# Patient Record
Sex: MALE | Race: WHITE | NOT HISPANIC OR LATINO | Employment: FULL TIME | URBAN - METROPOLITAN AREA
[De-identification: names, ages, dates, MRNs, and addresses within clinical notes are randomized per-mention and may not be internally consistent; named-entity substitution may affect disease eponyms.]

---

## 2023-01-19 ENCOUNTER — OFFICE VISIT (OUTPATIENT)
Dept: FAMILY MEDICINE | Facility: CLINIC | Age: 28
End: 2023-01-19
Payer: COMMERCIAL

## 2023-01-19 VITALS
RESPIRATION RATE: 20 BRPM | HEIGHT: 67 IN | OXYGEN SATURATION: 100 % | DIASTOLIC BLOOD PRESSURE: 60 MMHG | BODY MASS INDEX: 19.62 KG/M2 | HEART RATE: 76 BPM | WEIGHT: 125 LBS | SYSTOLIC BLOOD PRESSURE: 100 MMHG

## 2023-01-19 DIAGNOSIS — F90.0 ADHD, PREDOMINANTLY INATTENTIVE TYPE: ICD-10-CM

## 2023-01-19 DIAGNOSIS — Z23 ENCOUNTER FOR IMMUNIZATION: ICD-10-CM

## 2023-01-19 DIAGNOSIS — Z00.00 ROUTINE PHYSICAL EXAMINATION: Primary | ICD-10-CM

## 2023-01-19 DIAGNOSIS — B35.4 TINEA CORPORIS: ICD-10-CM

## 2023-01-19 DIAGNOSIS — R21 RASH: ICD-10-CM

## 2023-01-19 DIAGNOSIS — F33.9 EPISODE OF RECURRENT MAJOR DEPRESSIVE DISORDER, UNSPECIFIED DEPRESSION EPISODE SEVERITY (H): ICD-10-CM

## 2023-01-19 DIAGNOSIS — F41.9 ANXIETY: ICD-10-CM

## 2023-01-19 PROCEDURE — 90651 9VHPV VACCINE 2/3 DOSE IM: CPT | Performed by: FAMILY MEDICINE

## 2023-01-19 PROCEDURE — 99385 PREV VISIT NEW AGE 18-39: CPT | Mod: 25 | Performed by: FAMILY MEDICINE

## 2023-01-19 PROCEDURE — 90715 TDAP VACCINE 7 YRS/> IM: CPT | Performed by: FAMILY MEDICINE

## 2023-01-19 PROCEDURE — 90472 IMMUNIZATION ADMIN EACH ADD: CPT | Performed by: FAMILY MEDICINE

## 2023-01-19 PROCEDURE — 99214 OFFICE O/P EST MOD 30 MIN: CPT | Mod: 25 | Performed by: FAMILY MEDICINE

## 2023-01-19 PROCEDURE — 90471 IMMUNIZATION ADMIN: CPT | Performed by: FAMILY MEDICINE

## 2023-01-19 RX ORDER — BUPROPION HYDROCHLORIDE 150 MG/1
TABLET ORAL
Qty: 60 TABLET | Refills: 3 | Status: SHIPPED | OUTPATIENT
Start: 2023-01-19 | End: 2023-01-27

## 2023-01-19 RX ORDER — KETOCONAZOLE 20 MG/ML
SHAMPOO TOPICAL
Qty: 120 ML | Refills: 0 | Status: SHIPPED | OUTPATIENT
Start: 2023-01-19

## 2023-01-19 ASSESSMENT — ENCOUNTER SYMPTOMS
HEARTBURN: 0
DIZZINESS: 0
EYE PAIN: 0
CHILLS: 0
NAUSEA: 0
PALPITATIONS: 0
FEVER: 0
SHORTNESS OF BREATH: 0
COUGH: 0
DYSURIA: 0
HEADACHES: 0
JOINT SWELLING: 0
ABDOMINAL PAIN: 0
DIARRHEA: 0
SORE THROAT: 0
NERVOUS/ANXIOUS: 1
HEMATOCHEZIA: 0
MYALGIAS: 0
CONSTIPATION: 0
WEAKNESS: 0
HEMATURIA: 0
ARTHRALGIAS: 0
FREQUENCY: 0
PARESTHESIAS: 0

## 2023-01-19 ASSESSMENT — PATIENT HEALTH QUESTIONNAIRE - PHQ9
SUM OF ALL RESPONSES TO PHQ QUESTIONS 1-9: 12
10. IF YOU CHECKED OFF ANY PROBLEMS, HOW DIFFICULT HAVE THESE PROBLEMS MADE IT FOR YOU TO DO YOUR WORK, TAKE CARE OF THINGS AT HOME, OR GET ALONG WITH OTHER PEOPLE: SOMEWHAT DIFFICULT
SUM OF ALL RESPONSES TO PHQ QUESTIONS 1-9: 12

## 2023-01-19 ASSESSMENT — PAIN SCALES - GENERAL: PAINLEVEL: NO PAIN (0)

## 2023-01-19 NOTE — PROGRESS NOTES
Assessment/Plan:     Routine physical examination  Routine healthcare maintenance.  Preventative cares reviewed.  Annual physical exams to continue.    Encounter for immunization  Will provide HPV #3 as final shot and 3 shot series.  Also Adacel booster provided today.  Patient declines seasonal influenza, COVID-19 bivalent booster etc.  - Human Papilloma Virus Vaccine (Gardasil 9) 3 Dose IM  - TDAP VACCINE (Adacel, Boostrix)    Tinea corporis  Evidence for tinea corporis with possible tinea versicolor.  Ketoconazole shampoo as directed topically daily x3 days.  - ketoconazole (NIZORAL) 2 % external shampoo  Dispense: 120 mL; Refill: 0    Rash  As above.  - ketoconazole (NIZORAL) 2 % external shampoo  Dispense: 120 mL; Refill: 0    ADHD, predominantly inattentive type  ADHD predominantly inattentive type.  Anticipate benefits of bupropion  mg daily x7 days then 300 mg daily following with anticipated reassessment in office in 6 weeks.  - buPROPion (WELLBUTRIN XL) 150 MG 24 hr tablet  Dispense: 60 tablet; Refill: 3    Episode of recurrent major depressive disorder, unspecified depression episode severity (H)  Underlying history of depression reviewed, recurrent.  Bupropion  mg titrating to 300 mg daily dose as above.  - buPROPion (WELLBUTRIN XL) 150 MG 24 hr tablet  Dispense: 60 tablet; Refill: 3    Anxiety  Will monitor currently for anticipated benefits with bupropion use.  Otherwise addition anxiolytic medication options to be considered.          Subjective:     Geoffrey Menjivar is a 27 year old male who presents for an annual exam.  In general doing well.  Wants to discuss however recurrent concerns for depression and anxiety.  History of ADHD predominantly inattentive type historically.  Had utilize Ritalin as well as Concerta in the past.  Has not been on medication recently.  Sertraline utilize for anxiety depression management in adolescence.  Patient does have rash to left axillary region as  "well as left posterior upper arm question of tinea versicolor per patient.  Past medical social and family history reviewed and updated as noted below.  Comprehensive review of systems as above otherwise all negative.      Single  No children  Tobacco:  none  EtOH:  none  Mom - healthy  Dad - healthy  2 younger maternal half-bro -   1 younger maternal half-sis -   Surgeries:  tonsillectomy and wisdom teeth x 4  Hospitalizations:  none  Work:  [employer] (\[employer] in [facility])  Hobbies:  rock climbing (h/o [employer]); tech stuff with audio gear and computers      Healthy Habits:     Getting at least 3 servings of Calcium per day:  NO    Bi-annual eye exam:  NO    Dental care twice a year:  NO    Sleep apnea or symptoms of sleep apnea:  None    Diet:  Regular (no restrictions)    Frequency of exercise:  1 day/week    Duration of exercise:  15-30 minutes    Taking medications regularly:  Yes    Medication side effects:  None    PHQ-2 Total Score: 4    Additional concerns today:  Yes       Immunization History   Administered Date(s) Administered     COVID-19 Vaccine 12+ (Pfizer) 04/23/2021, 05/14/2021, 12/21/2021     COVID-19 Vaccine Bivalent Booster 18+ (Moderna) 12/12/2022     DTAP (<7y) 1996, 1996, 1996, 06/17/1997, 04/20/2001     HEPA 05/31/2008, 08/25/2009     HPV 05/07/2012, 07/22/2014     HPV Quadrivalent 05/07/2012     Hep B, Peds or Adolescent 1996     HepA-ped 2 Dose 05/31/2008, 08/25/2009     HepB 1996, 1996, 06/17/1997     HepB, Unspecified 1996, 06/17/1997     Hib (PRP-T) 1996, 1996, 1996, 06/17/1997     Hib, Unspecified 1996, 1996, 1996, 06/17/1997     Influenza (H1N1) 11/07/2009     Influenza (IIV3) PF 11/02/2005, 11/15/2006, 11/05/2007, 09/28/2009, 09/20/2010, 10/24/2011, 09/17/2012     MMR 06/17/1997, 04/20/2001     Mantoux Tuberculin Skin Test 06/04/2012     Meningococcal (Menomune ) 08/23/2010     " Meningococcal ACWY (Menactra ) 08/23/2010, 07/22/2014     Polio, Unspecified  1996, 1996, 1996, 04/20/2001     Poliovirus, inactivated (IPV) 1996, 1996, 1996, 04/20/2001     Td (Adult), Adsorbed 05/31/2008     Tdap (Adacel,Boostrix) 05/31/2008     Varicella 06/17/1997, 08/25/2009     Immunization status: HPV #3 as well as Adacel booster to be provided today otherwise patient declines seasonal influenza vaccination, Covid-19 bivalent booster etc.    Current Outpatient Medications   Medication Sig Dispense Refill     buPROPion (WELLBUTRIN XL) 150 MG 24 hr tablet Take one tablet (150 mg) by mouth daily x 7 days, then increase to two tablets (300 mg) by mouth daily 60 tablet 3     ketoconazole (NIZORAL) 2 % external shampoo Apply topically to wet skin, lather and leave on 5 minutes.  Then rinse.  Repeat daily x 3 days total. 120 mL 0     fluocinonide (LIDEX) 0.05 % ointment Apply sparingly to affected area on left arm twice daily.  Do not apply to face. (Patient not taking: Reported on 1/19/2023) 60 g 2     No past medical history on file.  Past Surgical History:   Procedure Laterality Date     TONSILLECTOMY      at 8 years old     Patient has no known allergies.  No family history on file.  Social History     Socioeconomic History     Marital status: Single     Spouse name: Not on file     Number of children: Not on file     Years of education: Not on file     Highest education level: Not on file   Occupational History     Not on file   Tobacco Use     Smoking status: Never     Smokeless tobacco: Not on file   Substance and Sexual Activity     Alcohol use: No     Drug use: No     Sexual activity: Never   Other Topics Concern     Parent/sibling w/ CABG, MI or angioplasty before 65F 55M? Not Asked   Social History Narrative     Not on file     Social Determinants of Health     Financial Resource Strain: Not on file   Food Insecurity: Not on file   Transportation Needs: Not on file  "  Physical Activity: Not on file   Stress: Not on file   Social Connections: Not on file   Intimate Partner Violence: Not on file   Housing Stability: Not on file       Review of Systems  Comprehensive ROS: as above, otherwise all negative.           Objective:     /60   Pulse 76   Resp 20   Ht 1.702 m (5' 7\")   Wt 56.7 kg (125 lb)   SpO2 100%   BMI 19.58 kg/m    Body mass index is 19.58 kg/m .    Physical    General Appearance:    Alert, cooperative, no distress, appears stated age.     Head:    Normocephalic, without obvious abnormality, atraumatic   Eyes:    PERRL, conjunctiva/corneas clear, EOM's intact, fundi     benign, both eyes        Ears:    Normal TM's and external ear canals, both ears   Nose:   Nares normal, septum midline, mucosa normal, no drainage    or sinus tenderness   Throat:   Lips, mucosa, and tongue normal; teeth and gums normal   Neck:   Supple, symmetrical, trachea midline, no adenopathy;        thyroid:  No enlargement/tenderness/nodules; no carotid    bruit or JVD   Back:     Symmetric, no curvature, ROM normal, no CVA tenderness   Lungs:     Clear to auscultation bilaterally, respirations unlabored   Chest wall:    No tenderness or deformity   Heart:    Regular rate and rhythm, S1 and S2 normal, no murmur, rub   or gallop   Abdomen:     Soft, non-tender, bowel sounds active all four quadrants,     no masses, no organomegaly.     Genitalia:    Deferred per patient request   Rectal:    deferred   Extremities:   Extremities normal, atraumatic, no cyanosis or edema   Pulses:   2+ and symmetric all extremities   Skin:   Skin color, texture, turgor normal, no rashes or lesions   Lymph nodes:   Cervical, supraclavicular, and axillary nodes normal   Neurologic:   CNII-XII intact. Normal strength, sensation and reflexes       throughout                This note has been dictated using voice recognition software and as a result may contain minor grammatical errors and unintended word " substitutions.         Answers for HPI/ROS submitted by the patient on 1/19/2023  If you checked off any problems, how difficult have these problems made it for you to do your work, take care of things at home, or get along with other people?: Somewhat difficult  PHQ9 TOTAL SCORE: 12  Frequency of exercise:: 1 day/week  Getting at least 3 servings of Calcium per day:: NO  Diet:: Regular (no restrictions)  Taking medications regularly:: Yes  Medication side effects:: None  Bi-annual eye exam:: NO  Dental care twice a year:: NO  Sleep apnea or symptoms of sleep apnea:: None  abdominal pain: No  Blood in stool: No  Blood in urine: No  chest pain: No  chills: No  congestion: No  constipation: No  cough: No  diarrhea: No  dizziness: No  ear pain: No  eye pain: No  nervous/anxious: Yes  fever: No  frequency: No  genital sores: No  headaches: No  hearing loss: No  heartburn: No  arthralgias: No  joint swelling: No  peripheral edema: No  mood changes: Yes  myalgias: No  nausea: No  dysuria: No  palpitations: No  Skin sensation changes: No  sore throat: No  urgency: No  rash: No  shortness of breath: No  visual disturbance: No  weakness: No  impotence: No  penile discharge: No  Additional concerns today:: Yes  Duration of exercise:: 15-30 minutes

## 2023-02-13 DIAGNOSIS — F33.9 EPISODE OF RECURRENT MAJOR DEPRESSIVE DISORDER, UNSPECIFIED DEPRESSION EPISODE SEVERITY (H): ICD-10-CM

## 2023-02-13 DIAGNOSIS — F90.0 ADHD, PREDOMINANTLY INATTENTIVE TYPE: ICD-10-CM

## 2023-02-14 RX ORDER — BUPROPION HYDROCHLORIDE 150 MG/1
TABLET ORAL
Qty: 180 TABLET | Refills: 1 | OUTPATIENT
Start: 2023-02-14

## 2023-03-23 ENCOUNTER — OFFICE VISIT (OUTPATIENT)
Dept: FAMILY MEDICINE | Facility: CLINIC | Age: 28
End: 2023-03-23
Payer: COMMERCIAL

## 2023-03-23 VITALS
RESPIRATION RATE: 16 BRPM | BODY MASS INDEX: 19.66 KG/M2 | SYSTOLIC BLOOD PRESSURE: 103 MMHG | HEART RATE: 88 BPM | WEIGHT: 125.5 LBS | DIASTOLIC BLOOD PRESSURE: 67 MMHG | TEMPERATURE: 98.8 F | OXYGEN SATURATION: 98 %

## 2023-03-23 DIAGNOSIS — F33.9 EPISODE OF RECURRENT MAJOR DEPRESSIVE DISORDER, UNSPECIFIED DEPRESSION EPISODE SEVERITY (H): ICD-10-CM

## 2023-03-23 DIAGNOSIS — F41.9 ANXIETY: ICD-10-CM

## 2023-03-23 DIAGNOSIS — B35.4 TINEA CORPORIS: ICD-10-CM

## 2023-03-23 DIAGNOSIS — F90.0 ADHD, PREDOMINANTLY INATTENTIVE TYPE: Primary | ICD-10-CM

## 2023-03-23 DIAGNOSIS — F90.0 ADHD, PREDOMINANTLY INATTENTIVE TYPE: ICD-10-CM

## 2023-03-23 PROCEDURE — 99214 OFFICE O/P EST MOD 30 MIN: CPT | Performed by: FAMILY MEDICINE

## 2023-03-23 RX ORDER — BUPROPION HYDROCHLORIDE 150 MG/1
TABLET ORAL
Qty: 270 TABLET | Refills: 1 | Status: SHIPPED | OUTPATIENT
Start: 2023-03-23 | End: 2023-03-24

## 2023-03-23 ASSESSMENT — ANXIETY QUESTIONNAIRES
3. WORRYING TOO MUCH ABOUT DIFFERENT THINGS: NOT AT ALL
IF YOU CHECKED OFF ANY PROBLEMS ON THIS QUESTIONNAIRE, HOW DIFFICULT HAVE THESE PROBLEMS MADE IT FOR YOU TO DO YOUR WORK, TAKE CARE OF THINGS AT HOME, OR GET ALONG WITH OTHER PEOPLE: NOT DIFFICULT AT ALL
7. FEELING AFRAID AS IF SOMETHING AWFUL MIGHT HAPPEN: NOT AT ALL
7. FEELING AFRAID AS IF SOMETHING AWFUL MIGHT HAPPEN: NOT AT ALL
4. TROUBLE RELAXING: SEVERAL DAYS
8. IF YOU CHECKED OFF ANY PROBLEMS, HOW DIFFICULT HAVE THESE MADE IT FOR YOU TO DO YOUR WORK, TAKE CARE OF THINGS AT HOME, OR GET ALONG WITH OTHER PEOPLE?: NOT DIFFICULT AT ALL
2. NOT BEING ABLE TO STOP OR CONTROL WORRYING: NOT AT ALL
5. BEING SO RESTLESS THAT IT IS HARD TO SIT STILL: NOT AT ALL
GAD7 TOTAL SCORE: 1
GAD7 TOTAL SCORE: 1
6. BECOMING EASILY ANNOYED OR IRRITABLE: NOT AT ALL
1. FEELING NERVOUS, ANXIOUS, OR ON EDGE: NOT AT ALL
GAD7 TOTAL SCORE: 1

## 2023-03-23 ASSESSMENT — PAIN SCALES - GENERAL: PAINLEVEL: NO PAIN (0)

## 2023-03-23 ASSESSMENT — PATIENT HEALTH QUESTIONNAIRE - PHQ9
10. IF YOU CHECKED OFF ANY PROBLEMS, HOW DIFFICULT HAVE THESE PROBLEMS MADE IT FOR YOU TO DO YOUR WORK, TAKE CARE OF THINGS AT HOME, OR GET ALONG WITH OTHER PEOPLE: NOT DIFFICULT AT ALL
SUM OF ALL RESPONSES TO PHQ QUESTIONS 1-9: 4
SUM OF ALL RESPONSES TO PHQ QUESTIONS 1-9: 4

## 2023-03-23 NOTE — PROGRESS NOTES
Assessment/Plan:    ADHD, predominantly inattentive type  ADHD predominantly inattentive type improved with bupropion currently at 300 mg daily dose however would like to try higher dose.  Will increase to 450 mg to see if further improvement in focus and concentration.  Feels that executive function has improved and does have improved appetite as well.  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  - buPROPion (WELLBUTRIN XL) 150 MG 24 hr tablet  Dispense: 270 tablet; Refill: 1    Episode of recurrent major depressive disorder, unspecified depression episode severity (H)  Underlying depression noted.  PHQ-9 questionnaire 4 out of 27.  Patient would like to try higher dose of bupropion XL currently 300 mg and did increase to 450 mg daily.  Reassess in August 2023.  - buPROPion (WELLBUTRIN XL) 150 MG 24 hr tablet  Dispense: 270 tablet; Refill: 1    Anxiety  SANA-7 questionnaire 1 out of 21 currently.    Tinea corporis  Nizoral 2% shampoo topically daily x3 days to area of tinea corporis question component of tinea versicolor.          Subjective:    Geoffrey Menjivar is seen today for follow-up assessment.  ADHD predominantly inattentive type.  Has utilized Ritalin and Concerta in the past.  Did discuss at physical January 19, 2023 regarding treatment options for ADHD.  Underlying depression and anxiety were reviewed and did initiate bupropion  mg daily x7 days then 300 mg daily following.  Has tolerated well without side effects.  Has had increased appetite.  Improvement in executive functioning as well described.  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  Has had rash on skin question tinea corporis etiology and did try Nizoral 2% shampoo previously.  Comprehensive review of systems as above otherwise all negative    Single   No children   Tobacco:  none   EtOH:  none   Mom - healthy   Dad - healthy   2 younger maternal half-bro -   1 younger maternal  "half-sis -   Surgeries:  tonsillectomy and wisdom teeth x 4   Hospitalizations:  none   Work:  [employer] (\[employer] in [employer])   Hobbies:  rock climbing (h/o [employer]); tech stuff with audio gear and computers    Past Surgical History:   Procedure Laterality Date     TONSILLECTOMY      at 8 years old        No family history on file.     No past medical history on file.     Social History     Tobacco Use     Smoking status: Never   Substance Use Topics     Alcohol use: No     Drug use: No        Current Outpatient Medications   Medication Sig Dispense Refill     buPROPion (WELLBUTRIN XL) 150 MG 24 hr tablet TAKE THREE TABLETS (450 MG) BY MOUTH DAILY 270 tablet 1     ketoconazole (NIZORAL) 2 % external shampoo Apply topically to wet skin, lather and leave on 5 minutes.  Then rinse.  Repeat daily x 3 days total. (Patient not taking: Reported on 3/23/2023) 120 mL 0          Objective:    Vitals:    03/23/23 1444   BP: 103/67   BP Location: Left arm   Patient Position: Sitting   Cuff Size: Adult Regular   Pulse: 88   Resp: 16   Temp: 98.8  F (37.1  C)   TempSrc: Temporal   SpO2: 98%   Weight: 56.9 kg (125 lb 8 oz)      Body mass index is 19.66 kg/m .    Alert.  No apparent distress.  Cooperative and forthcoming.  No psychomotor agitation.  Appropriate eye contact.  Well-groomed.  No psychosis.      This note has been dictated using voice recognition software and as a result may contain minor grammatical errors and unintended word substitutions.   Answers for HPI/ROS submitted by the patient on 3/23/2023  If you checked off any problems, how difficult have these problems made it for you to do your work, take care of things at home, or get along with other people?: Not difficult at all  PHQ9 TOTAL SCORE: 4  SANA 7 TOTAL SCORE: 1  Depression/Anxiety: Depression & Anxiety  Status since last visit:: better  Anxiety since last: : better  Other associated symptoms of depression:: Yes  Other associated " symotome: : No  Significant life event: : No  Anxious:: No  Current substance use:: No  What is the reason for your visit today? : Medication follow up  How many servings of fruits and vegetables do you eat daily?: 0-1  On average, how many sweetened beverages do you drink each day (Examples: soda, juice, sweet tea, etc.  Do NOT count diet or artificially sweetened beverages)?: 1  How many minutes a day do you exercise enough to make your heart beat faster?: 30 to 60  How many days a week do you exercise enough to make your heart beat faster?: 3 or less  How many days per week do you miss taking your medication?: 0

## 2023-03-24 DIAGNOSIS — F90.0 ADHD, PREDOMINANTLY INATTENTIVE TYPE: ICD-10-CM

## 2023-03-24 DIAGNOSIS — F33.9 EPISODE OF RECURRENT MAJOR DEPRESSIVE DISORDER, UNSPECIFIED DEPRESSION EPISODE SEVERITY (H): ICD-10-CM

## 2023-03-24 RX ORDER — BUPROPION HYDROCHLORIDE 150 MG/1
TABLET ORAL
Qty: 90 TABLET | Refills: 1 | Status: SHIPPED | OUTPATIENT
Start: 2023-03-24

## 2023-03-24 RX ORDER — BUPROPION HYDROCHLORIDE 300 MG/1
300 TABLET ORAL EVERY MORNING
Qty: 90 TABLET | Refills: 1 | Status: SHIPPED | OUTPATIENT
Start: 2023-03-24 | End: 2023-09-22

## 2023-03-24 NOTE — TELEPHONE ENCOUNTER
"Routing refill request to provider for review/approval because:  Patient requesting change to prescription from 3 tablets daily to 1 tablet of 150 mg and 1 tablet of 300 mg daily due to insurance. Prescriptions written per pharmacy request and routed to provider for review.    Last Written Prescription Date:  3/23/2023  Last Fill Quantity: 270 of 150 mg tablets,  # refills: 1   Last office visit provider:  3/23/2023     Requested Prescriptions   Pending Prescriptions Disp Refills     buPROPion (WELLBUTRIN XL) 150 MG 24 hr tablet [Pharmacy Med Name: BUPROPION HCL  MG TABLET] 270 tablet 1     Sig: TAKE THREE TABLETS (450 MG) BY MOUTH DAILY       SSRIs Protocol Passed - 3/23/2023  3:18 PM        Passed - PHQ-9 score less than 5 in past 6 months     Please review last PHQ-9 score.           Passed - Medication is Bupropion     If the medication is Bupropion (Wellbutrin), and the patient is taking for smoking cessation; OK to refill.          Passed - Medication is active on med list        Passed - Patient is age 18 or older        Passed - Recent (6 mo) or future (30 days) visit within the authorizing provider's specialty     Patient had office visit in the last 6 months or has a visit in the next 30 days with authorizing provider or within the authorizing provider's specialty.  See \"Patient Info\" tab in inbasket, or \"Choose Columns\" in Meds & Orders section of the refill encounter.                 Queenie Vazquez RN 03/24/23 2:17 PM      "

## 2023-03-26 DIAGNOSIS — F33.9 EPISODE OF RECURRENT MAJOR DEPRESSIVE DISORDER, UNSPECIFIED DEPRESSION EPISODE SEVERITY (H): ICD-10-CM

## 2023-03-26 DIAGNOSIS — F90.0 ADHD, PREDOMINANTLY INATTENTIVE TYPE: ICD-10-CM

## 2023-03-26 RX ORDER — BUPROPION HYDROCHLORIDE 450 MG/1
450 TABLET, FILM COATED, EXTENDED RELEASE ORAL DAILY
Qty: 30 TABLET | Refills: 3 | OUTPATIENT
Start: 2023-03-26

## 2023-03-26 RX ORDER — BUPROPION HYDROCHLORIDE 150 MG/1
TABLET ORAL
Qty: 90 TABLET | Refills: 1 | OUTPATIENT
Start: 2023-03-26

## 2023-03-26 RX ORDER — BUPROPION HYDROCHLORIDE 450 MG/1
450 TABLET, FILM COATED, EXTENDED RELEASE ORAL DAILY
Qty: 30 TABLET | Refills: 3 | Status: SHIPPED | OUTPATIENT
Start: 2023-03-26

## 2023-04-09 ENCOUNTER — HEALTH MAINTENANCE LETTER (OUTPATIENT)
Age: 28
End: 2023-04-09

## 2024-01-18 ENCOUNTER — PATIENT OUTREACH (OUTPATIENT)
Dept: CARE COORDINATION | Facility: CLINIC | Age: 29
End: 2024-01-18
Payer: COMMERCIAL

## 2024-02-01 ENCOUNTER — PATIENT OUTREACH (OUTPATIENT)
Dept: CARE COORDINATION | Facility: CLINIC | Age: 29
End: 2024-02-01
Payer: COMMERCIAL

## 2024-04-07 ENCOUNTER — HEALTH MAINTENANCE LETTER (OUTPATIENT)
Age: 29
End: 2024-04-07

## 2025-02-04 ASSESSMENT — ANXIETY QUESTIONNAIRES
7. FEELING AFRAID AS IF SOMETHING AWFUL MIGHT HAPPEN: NOT AT ALL
5. BEING SO RESTLESS THAT IT IS HARD TO SIT STILL: NOT AT ALL
8. IF YOU CHECKED OFF ANY PROBLEMS, HOW DIFFICULT HAVE THESE MADE IT FOR YOU TO DO YOUR WORK, TAKE CARE OF THINGS AT HOME, OR GET ALONG WITH OTHER PEOPLE?: NOT DIFFICULT AT ALL
1. FEELING NERVOUS, ANXIOUS, OR ON EDGE: SEVERAL DAYS
4. TROUBLE RELAXING: NOT AT ALL
IF YOU CHECKED OFF ANY PROBLEMS ON THIS QUESTIONNAIRE, HOW DIFFICULT HAVE THESE PROBLEMS MADE IT FOR YOU TO DO YOUR WORK, TAKE CARE OF THINGS AT HOME, OR GET ALONG WITH OTHER PEOPLE: NOT DIFFICULT AT ALL
7. FEELING AFRAID AS IF SOMETHING AWFUL MIGHT HAPPEN: NOT AT ALL
GAD7 TOTAL SCORE: 2
3. WORRYING TOO MUCH ABOUT DIFFERENT THINGS: NOT AT ALL
6. BECOMING EASILY ANNOYED OR IRRITABLE: SEVERAL DAYS
2. NOT BEING ABLE TO STOP OR CONTROL WORRYING: NOT AT ALL
GAD7 TOTAL SCORE: 2
GAD7 TOTAL SCORE: 2

## 2025-02-06 ENCOUNTER — OFFICE VISIT (OUTPATIENT)
Dept: FAMILY MEDICINE | Facility: CLINIC | Age: 30
End: 2025-02-06
Payer: COMMERCIAL

## 2025-02-06 VITALS
BODY MASS INDEX: 19.93 KG/M2 | HEIGHT: 67 IN | TEMPERATURE: 98.1 F | SYSTOLIC BLOOD PRESSURE: 100 MMHG | RESPIRATION RATE: 18 BRPM | HEART RATE: 85 BPM | OXYGEN SATURATION: 98 % | WEIGHT: 127 LBS | DIASTOLIC BLOOD PRESSURE: 60 MMHG

## 2025-02-06 DIAGNOSIS — F41.9 ANXIETY: ICD-10-CM

## 2025-02-06 DIAGNOSIS — F33.9 EPISODE OF RECURRENT MAJOR DEPRESSIVE DISORDER, UNSPECIFIED DEPRESSION EPISODE SEVERITY: Primary | ICD-10-CM

## 2025-02-06 DIAGNOSIS — F90.0 ADHD, PREDOMINANTLY INATTENTIVE TYPE: ICD-10-CM

## 2025-02-06 RX ORDER — BUPROPION HYDROCHLORIDE 150 MG/1
TABLET ORAL
Qty: 180 TABLET | Refills: 1 | Status: SHIPPED | OUTPATIENT
Start: 2025-02-06

## 2025-02-06 ASSESSMENT — PATIENT HEALTH QUESTIONNAIRE - PHQ9
SUM OF ALL RESPONSES TO PHQ QUESTIONS 1-9: 11
SUM OF ALL RESPONSES TO PHQ QUESTIONS 1-9: 11
10. IF YOU CHECKED OFF ANY PROBLEMS, HOW DIFFICULT HAVE THESE PROBLEMS MADE IT FOR YOU TO DO YOUR WORK, TAKE CARE OF THINGS AT HOME, OR GET ALONG WITH OTHER PEOPLE: SOMEWHAT DIFFICULT

## 2025-02-06 ASSESSMENT — PAIN SCALES - GENERAL: PAINLEVEL_OUTOF10: NO PAIN (0)

## 2025-02-06 NOTE — PROGRESS NOTES
"Assessment/Plan:    Episode of recurrent major depressive disorder, unspecified depression episode severity  PHQ-9 questionnaire 11 out of 27.  Patient does have good experience bupropion in the past and will resume bupropion  mg daily x 7 days then 300 mg daily following.  Reassess in office in 3 months, sooner if concerns.  - buPROPion (WELLBUTRIN XL) 150 MG 24 hr tablet  Dispense: 180 tablet; Refill: 1    Anxiety  SANA-7 questionnaire 2 out of 21 and doing better with techniques of \"mindfulness\".    ADHD, predominantly inattentive type  Bupropion has been helpful for ADHD management of symptoms including inattentiveness and poor concentration at [facility].  Will resume bupropion  mg daily x 7 days then increase to 300 mg daily following with reassessment through virtual visit anticipated in 3 months.  - buPROPion (WELLBUTRIN XL) 150 MG 24 hr tablet  Dispense: 180 tablet; Refill: 1               Subjective:    Geoffrey Menjivar is seen today for follow-up assessment.  Has not been seen since March 23, 2023 due to insurance concerns.  New job now as a [employer] next of the [employer].  Patient has had history of depression with anxiety.  Has utilize medicine in the past including sertraline however best response previously with bupropion including symptom management for ADHD predominantly inattentive type with poor focus and concentration in the past.  Patient would like to resume medication and is open to options for this.  Past medical social and family history reviewed and updated as noted below.      Single  No children  Tobacco:  none  EtOH:  none  Mom - healthy  Dad - healthy  2 younger maternal half-bro -   1 younger maternal half-sis -   Surgeries:  tonsillectomy and wisdom teeth x 4  Hospitalizations:  none  Work:  [employer] in [facility] (previeously at \"[employer] in [facility])  Hobbies:  rock climbing (h/o [employer]); tech stuff with audio gear and " "domingo      Past Surgical History:   Procedure Laterality Date    TONSILLECTOMY      at 8 years old        No family history on file.     No past medical history on file.     Social History     Tobacco Use    Smoking status: Never   Substance Use Topics    Alcohol use: No    Drug use: No        Current Outpatient Medications   Medication Sig Dispense Refill    buPROPion (WELLBUTRIN XL) 150 MG 24 hr tablet Take one tablet (150 mg) by mouth daily in the morning x 7 days, then take two tablets (300 mg) by mouth daily in the morning 180 tablet 1    buPROPion (WELLBUTRIN XL) 150 MG 24 hr tablet TAKE THREE TABLETS (450 MG) BY MOUTH DAILY 90 tablet 1    buPROPion (WELLBUTRIN XL) 300 MG 24 hr tablet TAKE 1 TABLET BY MOUTH EVERY MORNING 90 tablet 0    buPROPion HCl ER, XL, 450 MG TB24 Take 450 mg by mouth daily 30 tablet 3    ketoconazole (NIZORAL) 2 % external shampoo Apply topically to wet skin, lather and leave on 5 minutes.  Then rinse.  Repeat daily x 3 days total. 120 mL 0          Objective:    Vitals:    02/06/25 1557   BP: 100/60   Pulse: 85   Resp: 18   Temp: 98.1  F (36.7  C)   SpO2: 98%   Weight: 57.6 kg (127 lb)   Height: 1.702 m (5' 7\")      Body mass index is 19.89 kg/m .    Alert.  Cooperative.  Well-groomed.  No psychomotor agitation.  Pleasant.      This note has been dictated using voice recognition software and as a result may contain minor grammatical errors and unintended word substitutions.       Answers submitted by the patient for this visit:  Patient Health Questionnaire (G7) (Submitted on 2/4/2025)  SANA 7 TOTAL SCORE: 2  Depression / Anxiety Questionnaire (Submitted on 2/4/2025)  Chief Complaint: Chronic problems general questions HPI Form  Depression/Anxiety: Depression & Anxiety  Depression & Anxiety (Submitted on 2/4/2025)  Chief Complaint: Chronic problems general questions HPI Form  Status since last visit:: medium  Anxiety since last: : better  Other associated symptoms of depression:: " Yes  Other associated symotome: : Yes  Significant life event: : No  Anxious:: Yes  Current substance use:: No  General Questionnaire (Submitted on 2/4/2025)  Chief Complaint: Chronic problems general questions HPI Form  What is the reason for your visit today? : Going back on medication/treatment for mental health conditions  How many servings of fruits and vegetables do you eat daily?: 2-3  On average, how many sweetened beverages do you drink each day (Examples: soda, juice, sweet tea, etc.  Do NOT count diet or artificially sweetened beverages)?: 2  How many minutes a day do you exercise enough to make your heart beat faster?: 9 or less  How many days a week do you exercise enough to make your heart beat faster?: 3 or less  Questionnaire about: Chronic problems general questions HPI Form (Submitted on 2/4/2025)  Chief Complaint: Chronic problems general questions HPI Form

## 2025-04-19 ENCOUNTER — HEALTH MAINTENANCE LETTER (OUTPATIENT)
Age: 30
End: 2025-04-19

## 2025-05-21 ENCOUNTER — PATIENT OUTREACH (OUTPATIENT)
Dept: CARE COORDINATION | Facility: CLINIC | Age: 30
End: 2025-05-21
Payer: COMMERCIAL

## 2025-05-21 ASSESSMENT — ANXIETY QUESTIONNAIRES
4. TROUBLE RELAXING: MORE THAN HALF THE DAYS
1. FEELING NERVOUS, ANXIOUS, OR ON EDGE: SEVERAL DAYS
IF YOU CHECKED OFF ANY PROBLEMS ON THIS QUESTIONNAIRE, HOW DIFFICULT HAVE THESE PROBLEMS MADE IT FOR YOU TO DO YOUR WORK, TAKE CARE OF THINGS AT HOME, OR GET ALONG WITH OTHER PEOPLE: SOMEWHAT DIFFICULT
5. BEING SO RESTLESS THAT IT IS HARD TO SIT STILL: NOT AT ALL
GAD7 TOTAL SCORE: 6
6. BECOMING EASILY ANNOYED OR IRRITABLE: SEVERAL DAYS
3. WORRYING TOO MUCH ABOUT DIFFERENT THINGS: SEVERAL DAYS
7. FEELING AFRAID AS IF SOMETHING AWFUL MIGHT HAPPEN: NOT AT ALL
7. FEELING AFRAID AS IF SOMETHING AWFUL MIGHT HAPPEN: NOT AT ALL
GAD7 TOTAL SCORE: 6
2. NOT BEING ABLE TO STOP OR CONTROL WORRYING: SEVERAL DAYS
GAD7 TOTAL SCORE: 6
8. IF YOU CHECKED OFF ANY PROBLEMS, HOW DIFFICULT HAVE THESE MADE IT FOR YOU TO DO YOUR WORK, TAKE CARE OF THINGS AT HOME, OR GET ALONG WITH OTHER PEOPLE?: SOMEWHAT DIFFICULT

## 2025-05-22 ENCOUNTER — OFFICE VISIT (OUTPATIENT)
Dept: FAMILY MEDICINE | Facility: CLINIC | Age: 30
End: 2025-05-22
Payer: COMMERCIAL

## 2025-05-22 VITALS
RESPIRATION RATE: 17 BRPM | HEART RATE: 100 BPM | SYSTOLIC BLOOD PRESSURE: 110 MMHG | WEIGHT: 126 LBS | BODY MASS INDEX: 19.78 KG/M2 | DIASTOLIC BLOOD PRESSURE: 70 MMHG | HEIGHT: 67 IN | TEMPERATURE: 98.4 F | OXYGEN SATURATION: 99 %

## 2025-05-22 DIAGNOSIS — F33.9 EPISODE OF RECURRENT MAJOR DEPRESSIVE DISORDER, UNSPECIFIED DEPRESSION EPISODE SEVERITY: Primary | ICD-10-CM

## 2025-05-22 DIAGNOSIS — F41.9 ANXIETY: ICD-10-CM

## 2025-05-22 DIAGNOSIS — F90.0 ADHD, PREDOMINANTLY INATTENTIVE TYPE: ICD-10-CM

## 2025-05-22 DIAGNOSIS — F33.1 MODERATE RECURRENT MAJOR DEPRESSION (H): ICD-10-CM

## 2025-05-22 RX ORDER — BUPROPION HYDROCHLORIDE 300 MG/1
TABLET ORAL
Qty: 90 TABLET | Refills: 3 | Status: SHIPPED | OUTPATIENT
Start: 2025-05-22

## 2025-05-22 ASSESSMENT — PAIN SCALES - GENERAL: PAINLEVEL_OUTOF10: NO PAIN (0)

## 2025-05-22 ASSESSMENT — PATIENT HEALTH QUESTIONNAIRE - PHQ9
SUM OF ALL RESPONSES TO PHQ QUESTIONS 1-9: 6
10. IF YOU CHECKED OFF ANY PROBLEMS, HOW DIFFICULT HAVE THESE PROBLEMS MADE IT FOR YOU TO DO YOUR WORK, TAKE CARE OF THINGS AT HOME, OR GET ALONG WITH OTHER PEOPLE: SOMEWHAT DIFFICULT
SUM OF ALL RESPONSES TO PHQ QUESTIONS 1-9: 6

## 2025-05-22 NOTE — PROGRESS NOTES
"Assessment/Plan:    Episode of recurrent major depressive disorder, unspecified depression episode severity  Recurrent major depression, mild PHQ-9 questionnaire 6 out of 27.  Continuing bupropion  mg daily dose.  Did update prescription to show 300 mg once daily instead of 2 of the 150 mg tablets together.  Mail-order pharmacy utilized for 90-day supply with refills.  - buPROPion (WELLBUTRIN XL) 300 MG 24 hr tablet  Dispense: 90 tablet; Refill: 3    Anxiety  SANA-7 questionnaire 6 out of 21.  Doing well currently.    ADHD, predominantly inattentive type  Benefits with improved executive functioning stating able to do a lot more with less impulsivity and sleeping better.  Improved focus and concentration while on bupropion  mg total dose daily.  Refill provided.  - buPROPion (WELLBUTRIN XL) 300 MG 24 hr tablet  Dispense: 90 tablet; Refill: 3               Subjective:    Geoffrey Menjivar is seen today for follow-up assessment.  Underlying depression with anxiety.  Also concerns for ADHD predominantly inattentive type.  Did review office note from February 6, 2025 with initiation of bupropion  mg daily x 7 days then titrated to 300 mg daily following.  Doing well.  Sleeping better.  Better focus and concentration with less impulsivity.  Executive function described as improved.  Less depression and less anxiety.  Denies recent illness.  Past medical social and family history reviewed and updated as noted below.  Comprehensive review of systems as above otherwise all negative.      Single   No children   Tobacco:  none   EtOH:  none   Mom - healthy   Dad - healthy   2 younger maternal half-bro -   1 younger maternal half-sis -   Surgeries:  tonsillectomy and wisdom teeth x 4   Hospitalizations:  none   Work:  [employer] in [facility] (previeously at \"[employer] in [facility])   Hobbies:  rock climbing (h/o [employer]); tech stuff with audio gear and computers " "        Past Surgical History:   Procedure Laterality Date    TONSILLECTOMY      at 8 years old        No family history on file.     No past medical history on file.     Social History     Tobacco Use    Smoking status: Never   Substance Use Topics    Alcohol use: No    Drug use: No        Current Outpatient Medications   Medication Sig Dispense Refill    buPROPion (WELLBUTRIN XL) 300 MG 24 hr tablet Take one tablet (300 mg) by mouth daily in the morning 90 tablet 3          Objective:    Vitals:    05/22/25 1555   BP: 110/70   Pulse: 100   Resp: 17   Temp: 98.4  F (36.9  C)   SpO2: 99%   Weight: 57.2 kg (126 lb)   Height: 1.702 m (5' 7\")      Body mass index is 19.73 kg/m .    Alert.  No apparent distress.  Cooperative and forthcoming.  No significant psychomotor agitation.  Pleasant.  Extremities warm and dry.      This note has been dictated using voice recognition software and as a result may contain minor grammatical errors and unintended word substitutions.           Answers submitted by the patient for this visit:  Patient Health Questionnaire (Submitted on 5/22/2025)  If you checked off any problems, how difficult have these problems made it for you to do your work, take care of things at home, or get along with other people?: Somewhat difficult  PHQ9 TOTAL SCORE: 6  Patient Health Questionnaire (G7) (Submitted on 5/21/2025)  SANA 7 TOTAL SCORE: 6  Depression / Anxiety Questionnaire (Submitted on 5/21/2025)  Chief Complaint: Chronic problems general questions HPI Form  Depression/Anxiety: Depression & Anxiety  Depression & Anxiety (Submitted on 5/21/2025)  Chief Complaint: Chronic problems general questions HPI Form  Status since last visit:: better  Anxiety since last: : better  Other associated symptoms of depression:: No  Other associated symotome: : No  Significant life event: : other  Anxious:: No  Current substance use:: No  General Questionnaire (Submitted on 5/21/2025)  Chief Complaint: Chronic " problems general questions HPI Form  How many servings of fruits and vegetables do you eat daily?: 2-3  On average, how many sweetened beverages do you drink each day (Examples: soda, juice, sweet tea, etc.  Do NOT count diet or artificially sweetened beverages)?: 1  How many minutes a day do you exercise enough to make your heart beat faster?: 30 to 60  How many days a week do you exercise enough to make your heart beat faster?: 5  How many days per week do you miss taking your medication?: 0  Questionnaire about: Chronic problems general questions HPI Form (Submitted on 5/21/2025)  Chief Complaint: Chronic problems general questions HPI Form